# Patient Record
Sex: MALE | Race: WHITE | NOT HISPANIC OR LATINO | Employment: UNEMPLOYED | URBAN - METROPOLITAN AREA
[De-identification: names, ages, dates, MRNs, and addresses within clinical notes are randomized per-mention and may not be internally consistent; named-entity substitution may affect disease eponyms.]

---

## 2018-02-23 ENCOUNTER — APPOINTMENT (OUTPATIENT)
Dept: GENERAL RADIOLOGY | Facility: HOSPITAL | Age: 23
End: 2018-02-23

## 2018-02-23 ENCOUNTER — HOSPITAL ENCOUNTER (EMERGENCY)
Facility: HOSPITAL | Age: 23
Discharge: HOME | End: 2018-02-23
Attending: EMERGENCY MEDICINE | Admitting: EMERGENCY MEDICINE

## 2018-02-23 VITALS
SYSTOLIC BLOOD PRESSURE: 133 MMHG | OXYGEN SATURATION: 99 % | HEIGHT: 71 IN | TEMPERATURE: 97.6 F | WEIGHT: 181 LBS | RESPIRATION RATE: 16 BRPM | DIASTOLIC BLOOD PRESSURE: 69 MMHG | BODY MASS INDEX: 25.34 KG/M2 | HEART RATE: 55 BPM

## 2018-02-23 DIAGNOSIS — M77.50 TENDONITIS OF FOOT: Primary | ICD-10-CM

## 2018-02-23 PROCEDURE — 99283 EMERGENCY DEPT VISIT LOW MDM: CPT

## 2018-02-23 PROCEDURE — 73630 X-RAY EXAM OF FOOT: CPT

## 2018-02-24 NOTE — ED PROVIDER NOTES
Subjective   HPI Comments: Patient presents to the emergency department with complaint of pain to the base of his fifth metatarsal.  Onset of pain was after running a 10K 4 days ago.  Patient has a history of fracture to the base of the fifth metatarsal in the year 2013.  Patient recalls no specific injury otherwise recently.  Pain is reproduced with weightbearing only.    Patient is a 22 y.o. male presenting with lower extremity pain.   History provided by:  Patient  Lower Extremity Issue   Location:  Foot  Time since incident:  4 days  Injury: yes (ran a 10K four days ago )    Mechanism of injury comment:  Repetitive motion/ running long distance  Foot location:  L foot  Pain details:     Quality:  Aching    Radiates to:  Does not radiate    Severity:  Moderate    Onset quality:  Sudden    Duration:  4 days    Timing:  Intermittent    Progression:  Unchanged  Chronicity:  New  Dislocation: no    Foreign body present:  No foreign bodies  Prior injury to area:  Yes (hx of Brandt fracture twice in 2013. )  Relieved by:  Compression, rest and elevation  Worsened by:  Bearing weight  Ineffective treatments: advil   Associated symptoms: no back pain, no decreased ROM, no fatigue, no fever, no muscle weakness, no neck pain, no numbness, no swelling and no tingling        Review of Systems   Constitutional: Negative.  Negative for diaphoresis, fatigue and fever.   HENT: Negative for sore throat.    Eyes: Negative.  Negative for pain and visual disturbance.   Respiratory: Negative for cough, shortness of breath, wheezing and stridor.    Cardiovascular: Negative.  Negative for chest pain.   Gastrointestinal: Negative.  Negative for abdominal pain, diarrhea, nausea and vomiting.   Endocrine: Negative.    Genitourinary: Negative.  Negative for dysuria.   Musculoskeletal: Negative for back pain and neck pain.        Foot pain  See HPI     Skin: Negative.  Negative for pallor and rash.   Allergic/Immunologic: Negative.     Neurological: Negative.  Negative for syncope and headaches.   Hematological: Negative.    Psychiatric/Behavioral: Negative.  Negative for agitation.   All other systems reviewed and are negative.      History reviewed. No pertinent past medical history.    No Known Allergies    History reviewed. No pertinent surgical history.    History reviewed. No pertinent family history.    Social History     Social History   • Marital status: Single     Spouse name: N/A   • Number of children: N/A   • Years of education: N/A     Social History Main Topics   • Smoking status: Never Smoker   • Smokeless tobacco: None   • Alcohol use No   • Drug use: No   • Sexual activity: Defer     Other Topics Concern   • None     Social History Narrative   • None           Objective   Physical Exam   Constitutional: He is oriented to person, place, and time. He appears well-developed and well-nourished. No distress.   HENT:   Head: Normocephalic and atraumatic.   Eyes: EOM are normal. Pupils are equal, round, and reactive to light.   Neck: Normal range of motion. Neck supple.   Cardiovascular: Normal rate and regular rhythm.    Pulmonary/Chest: Effort normal and breath sounds normal. He has no wheezes. He has no rales.   Musculoskeletal: Normal range of motion. He exhibits tenderness. He exhibits no edema or deformity.   Left foot:  Normal to inspection.  Localizes tenderness to the base of the 5th metatarsal.  Unable to reproduce presneting pain with palpation.  Proximal and distal joints are negative.  NV exam is negative.       Neurological: He is alert and oriented to person, place, and time. He exhibits normal muscle tone. Coordination normal.   Skin: Skin is warm and dry. No rash noted. He is not diaphoretic. No erythema. No pallor.   Psychiatric: He has a normal mood and affect. His behavior is normal. Thought content normal.   Nursing note and vitals reviewed.      Procedures         ED Course  ED Course      No results found for  "this or any previous visit (from the past 24 hour(s)).  Note: In addition to lab results from this visit, the labs listed above may include labs taken at another facility or during a different encounter within the last 24 hours. Please correlate lab times with ED admission and discharge times for further clarification of the services performed during this visit.    XR Foot 3+ View Left   Final Result   No acute osseous abnormality.       DICTATED:     02/23/2018   EDITED    :     02/23/2018        This report was finalized on 2/24/2018 10:33 AM by Dr. Ady Puga.            Vitals:    02/23/18 1735   BP: 133/69   BP Location: Left arm   Patient Position: Sitting   Pulse: 55   Resp: 16   Temp: 97.6 °F (36.4 °C)   TempSrc: Oral   SpO2: 99%   Weight: 82.1 kg (181 lb)   Height: 180.3 cm (71\")     Medications - No data to display  ECG/EMG Results (last 24 hours)     ** No results found for the last 24 hours. **                    [facility]    Final diagnoses:   Tendonitis of foot            Sigrid Khanna, APRN  02/24/18 2252    "